# Patient Record
Sex: FEMALE | Race: WHITE | Employment: OTHER | URBAN - METROPOLITAN AREA
[De-identification: names, ages, dates, MRNs, and addresses within clinical notes are randomized per-mention and may not be internally consistent; named-entity substitution may affect disease eponyms.]

---

## 2019-07-30 ENCOUNTER — OFFICE VISIT (OUTPATIENT)
Dept: CARDIOLOGY CLINIC | Age: 59
End: 2019-07-30
Payer: MEDICARE

## 2019-07-30 VITALS
OXYGEN SATURATION: 94 % | HEIGHT: 62 IN | WEIGHT: 148 LBS | HEART RATE: 85 BPM | SYSTOLIC BLOOD PRESSURE: 145 MMHG | BODY MASS INDEX: 27.23 KG/M2 | DIASTOLIC BLOOD PRESSURE: 90 MMHG

## 2019-07-30 DIAGNOSIS — I25.10 CORONARY ARTERY DISEASE INVOLVING NATIVE HEART, ANGINA PRESENCE UNSPECIFIED, UNSPECIFIED VESSEL OR LESION TYPE: ICD-10-CM

## 2019-07-30 DIAGNOSIS — Z76.89 ENCOUNTER TO ESTABLISH CARE: Primary | ICD-10-CM

## 2019-07-30 DIAGNOSIS — E78.5 HYPERLIPIDEMIA, UNSPECIFIED HYPERLIPIDEMIA TYPE: ICD-10-CM

## 2019-07-30 DIAGNOSIS — G45.8 SUBCLAVIAN STEAL SYNDROME: ICD-10-CM

## 2019-07-30 DIAGNOSIS — I65.29 CAROTID ATHEROSCLEROSIS, UNSPECIFIED LATERALITY: ICD-10-CM

## 2019-07-30 PROCEDURE — G8419 CALC BMI OUT NRM PARAM NOF/U: HCPCS | Performed by: INTERNAL MEDICINE

## 2019-07-30 PROCEDURE — 1036F TOBACCO NON-USER: CPT | Performed by: INTERNAL MEDICINE

## 2019-07-30 PROCEDURE — 99204 OFFICE O/P NEW MOD 45 MIN: CPT | Performed by: INTERNAL MEDICINE

## 2019-07-30 PROCEDURE — 3017F COLORECTAL CA SCREEN DOC REV: CPT | Performed by: INTERNAL MEDICINE

## 2019-07-30 PROCEDURE — 93000 ELECTROCARDIOGRAM COMPLETE: CPT | Performed by: INTERNAL MEDICINE

## 2019-07-30 PROCEDURE — G8427 DOCREV CUR MEDS BY ELIG CLIN: HCPCS | Performed by: INTERNAL MEDICINE

## 2019-07-30 PROCEDURE — G8598 ASA/ANTIPLAT THER USED: HCPCS | Performed by: INTERNAL MEDICINE

## 2019-07-30 RX ORDER — GABAPENTIN 100 MG/1
100 CAPSULE ORAL 3 TIMES DAILY
COMMUNITY

## 2019-07-30 RX ORDER — DIAZEPAM 2 MG/1
2 TABLET ORAL EVERY 12 HOURS PRN
COMMUNITY

## 2019-07-30 SDOH — HEALTH STABILITY: MENTAL HEALTH: HOW OFTEN DO YOU HAVE A DRINK CONTAINING ALCOHOL?: NEVER

## 2019-07-30 NOTE — PROGRESS NOTES
completed at [address] in [facility] by Dr. Channing LOPEZ Plan  Clinically stable. No evidence of CHF. No angina. HTN controlled. Continue medical management of CAD with ASA and BB. Risk factor modification also discussed. Fasting lipid profile, CMP  before next visit. Carotid US to assess carotid disease s/p Carotid endarterectomy. Obtain records from [facility] in [facility]. Thank you very much for allowing me to participate in the care of your patient. Please do not hesitate to contact me if you have any questions. Sincerely,  Kimberlyn You MD    [address]    This note was scribed in the presence of Kimberlyn You MD by Vinicius Gonsalves RN.

## 2019-09-12 ENCOUNTER — TELEPHONE (OUTPATIENT)
Dept: CARDIOLOGY CLINIC | Age: 59
End: 2019-09-12

## 2019-10-09 ENCOUNTER — TELEPHONE (OUTPATIENT)
Dept: CARDIOLOGY CLINIC | Age: 59
End: 2019-10-09

## 2019-11-12 ENCOUNTER — OFFICE VISIT (OUTPATIENT)
Dept: CARDIOLOGY CLINIC | Age: 59
End: 2019-11-12
Payer: COMMERCIAL

## 2019-11-12 VITALS
SYSTOLIC BLOOD PRESSURE: 130 MMHG | WEIGHT: 145.8 LBS | DIASTOLIC BLOOD PRESSURE: 80 MMHG | OXYGEN SATURATION: 98 % | HEIGHT: 62 IN | HEART RATE: 78 BPM | BODY MASS INDEX: 26.83 KG/M2

## 2019-11-12 DIAGNOSIS — E78.00 PURE HYPERCHOLESTEROLEMIA: ICD-10-CM

## 2019-11-12 DIAGNOSIS — I25.119 CORONARY ARTERY DISEASE INVOLVING NATIVE CORONARY ARTERY OF NATIVE HEART WITH ANGINA PECTORIS (HCC): Primary | ICD-10-CM

## 2019-11-12 DIAGNOSIS — I73.9 CLAUDICATION (HCC): ICD-10-CM

## 2019-11-12 PROCEDURE — 93000 ELECTROCARDIOGRAM COMPLETE: CPT | Performed by: INTERNAL MEDICINE

## 2019-11-12 PROCEDURE — 99215 OFFICE O/P EST HI 40 MIN: CPT | Performed by: INTERNAL MEDICINE

## 2019-11-12 RX ORDER — ISOSORBIDE MONONITRATE 30 MG/1
30 TABLET, EXTENDED RELEASE ORAL DAILY
Qty: 90 TABLET | Refills: 3 | Status: SHIPPED | OUTPATIENT
Start: 2019-11-12 | End: 2020-02-04

## 2019-11-19 ENCOUNTER — OFFICE VISIT (OUTPATIENT)
Dept: CARDIOLOGY CLINIC | Age: 59
End: 2019-11-19
Payer: COMMERCIAL

## 2019-11-19 VITALS
SYSTOLIC BLOOD PRESSURE: 114 MMHG | DIASTOLIC BLOOD PRESSURE: 72 MMHG | HEIGHT: 62 IN | WEIGHT: 144 LBS | BODY MASS INDEX: 26.5 KG/M2 | HEART RATE: 84 BPM | OXYGEN SATURATION: 98 %

## 2019-11-19 DIAGNOSIS — I65.23 BILATERAL CAROTID ARTERY STENOSIS: ICD-10-CM

## 2019-11-19 DIAGNOSIS — G45.8 SUBCLAVIAN STEAL SYNDROME: Primary | ICD-10-CM

## 2019-11-19 DIAGNOSIS — E78.2 MIXED HYPERLIPIDEMIA: ICD-10-CM

## 2019-11-19 DIAGNOSIS — I25.10 CORONARY ARTERY DISEASE INVOLVING NATIVE CORONARY ARTERY OF NATIVE HEART WITHOUT ANGINA PECTORIS: ICD-10-CM

## 2019-11-19 DIAGNOSIS — I10 ESSENTIAL HYPERTENSION: ICD-10-CM

## 2019-11-19 DIAGNOSIS — I87.1: ICD-10-CM

## 2019-11-19 PROCEDURE — 99214 OFFICE O/P EST MOD 30 MIN: CPT | Performed by: INTERNAL MEDICINE

## 2019-12-04 ENCOUNTER — TELEPHONE (OUTPATIENT)
Dept: CARDIOLOGY CLINIC | Age: 59
End: 2019-12-04

## 2019-12-17 ENCOUNTER — TELEPHONE (OUTPATIENT)
Dept: CARDIOLOGY CLINIC | Age: 59
End: 2019-12-17

## 2019-12-17 NOTE — TELEPHONE ENCOUNTER
Dr. Hollins Leader please review and advise with any recommendations. Patient had CTA head and neck completed at [facility], scanned in for your review.

## 2020-01-09 NOTE — TELEPHONE ENCOUNTER
Attempted to call patient again today. Left message on voicemail asking patient to return call to our office regarding test results, OK per HIPAA.

## 2020-01-13 NOTE — TELEPHONE ENCOUNTER
Attempted to call Lachelle Deras and patients daughter Skylar. Left a message per HIP AA on their VM.

## 2020-01-20 NOTE — TELEPHONE ENCOUNTER
Called and left a message for [employer] (on HIPPA) and attempted to reach patient but was unable to leave a message.

## 2020-01-28 NOTE — PROGRESS NOTES
(REPATHA) 140 MG/ML SOSY Inject into the skin      aspirin 81 MG tablet Take 81 mg by mouth daily      gabapentin (NEURONTIN) 100 MG capsule Take 100 mg by mouth 3 times daily.  diazepam (VALIUM) 2 MG tablet Take 2 mg by mouth every 12 hours as needed for Anxiety.  vitamin D (CHOLECALCIFEROL) 1000 UNIT TABS tablet Take 1,000 Units by mouth daily      Cyanocobalamin (VITAMIN B 12 PO) Take by mouth      ticagrelor (BRILINTA) 90 MG TABS tablet Take 1 tablet by mouth 2 times daily 180 tablet 3    metoprolol tartrate (LOPRESSOR) 25 MG tablet Take two tablets by mouth in am and 1 tablet by mouth in pm 90 tablet 5     No current facility-administered medications for this visit. Review of Systems:  · Constitutional: No unanticipated weight loss. There's been no change in energy level, sleep pattern, or activity level. No fevers, chills. · Eyes: No visual changes or diplopia. No scleral icterus. · ENT: No Headaches, hearing loss or vertigo. No mouth sores or sore throat. · Cardiovascular: as reviewed in HPI  · Respiratory: No cough or wheezing, no sputum production. No hemoptysis. · Gastrointestinal: No abdominal pain, appetite loss, blood in stools. No change in bowel or bladder habits. · Genitourinary: No dysuria, trouble voiding, or hematuria. · Musculoskeletal:  No gait disturbance, no joint complaints. · Integumentary: No rash or pruritis. · Neurological: No headache, diplopia, change in muscle strength, numbness or tingling. · Psychiatric: No anxiety or depression. · Endocrine: No temperature intolerance. No excessive thirst, fluid intake, or urination. No tremor. · Hematologic/Lymphatic: No abnormal bruising or bleeding, blood clots or swollen lymph nodes. · Allergic/Immunologic: No nasal congestion or hives. Physical Exam:   Constitutional: The patient is oriented to person, place, and time. Appears well-developed and well-nourished. In no acute distress. Head: Xanthalamus. Normocephalic and atraumatic. Pupils equal and round. Neck: Neck supple. No JVP or carotid bruit appreciated. No mass and no thyromegaly present. No lymphadenopathy present. Cardiovascular: Normal rate. Normal heart sounds. Exam reveals no gallop and no friction rub. No murmur heard. Pulmonary/Chest: Effort normal and breath sounds normal. No respiratory distress. No wheezes, rhonchi or rales. Abdominal: Soft, non-tender. Bowel sounds are normal. Exhibits no organomegaly, mass or bruit. Extremities: No edema. No cyanosis or clubbing. Pulses are 2+ radial and carotid bilaterally. Neurological: No gross cranial nerve deficit. Coordination normal.   Skin: Skin is warm and dry. There is no rash or diaphoresis. Psychiatric: Patient has a normal mood and affect. Speech is normal and behavior is normal.      /82 (Site: Right Upper Arm, Position: Sitting, Cuff Size: Medium Adult)   Pulse 91   Ht 5' 2\" (1.575 m)   Wt 144 lb (65.3 kg)   SpO2 99%   BMI 26.34 kg/m²   Wt Readings from Last 3 Encounters:   02/04/20 144 lb (65.3 kg)   11/19/19 144 lb (65.3 kg)   11/12/19 145 lb 12.8 oz (66.1 kg)       Lab Review:   FLP:  No results found for: TRIG, HDL, LDLCALC, LDLDIRECT, LABVLDL  BUN/Creatinine:  No results found for: BUN, CREATININE    Assessment/Plan:     Coronary Artery Disease  -S/p stent 8/2017 to Circumflex/OM   - Echo 8/2017 LVEF normal, diastolic dysfx, LVH.   - EKG 11/12/2019 NSR, NS T wave changes. Hypertension   -Controlled. Hyperlipidemia   -Intolerant to oral statins (caused serve muscle aches)  -Taking Repatha (resumed 1 month ago)  -  12/2019. Carotid disease with subclavian steel  -S/p Carotid endarterectomy 2014  - 10/2019 73-88% LANDON, 76-13% LICA, L SC stenosis. Referred to Dr. Rama Seymour to discuss stenting. -CTA head and neck ordered 11/2019 by Dr. Rama Seymour showed 10-15% left carotid, 55-65% LANDON, 65-75% transient left vertebral artery.        Pseudo claudication Reports leg

## 2020-02-04 ENCOUNTER — OFFICE VISIT (OUTPATIENT)
Dept: CARDIOLOGY CLINIC | Age: 60
End: 2020-02-04
Payer: COMMERCIAL

## 2020-02-04 VITALS
OXYGEN SATURATION: 99 % | SYSTOLIC BLOOD PRESSURE: 124 MMHG | DIASTOLIC BLOOD PRESSURE: 82 MMHG | HEIGHT: 62 IN | HEART RATE: 91 BPM | WEIGHT: 144 LBS | BODY MASS INDEX: 26.5 KG/M2

## 2020-02-04 PROCEDURE — 99214 OFFICE O/P EST MOD 30 MIN: CPT | Performed by: INTERNAL MEDICINE

## 2020-02-04 RX ORDER — ASPIRIN 81 MG/1
81 TABLET ORAL DAILY
Qty: 90 TABLET | Refills: 1
Start: 2020-02-04

## 2020-02-04 NOTE — LETTER
[facility] - [facility] [address]  [address]  Phone: [phone]  Fax: [phone]    Noble Del Rosario MD        February 10, 2020     Greg Lopez  [address]    Patient: Ирина Matthews  MR Number: <[id number]>  YOB: 1960  Date of Visit: 2/4/2020    Dear Dr. Greg Lopez: Thank you for your referral. Progress note attached in visit summary. If you have questions, please do not hesitate to call me. I look forward to following Monserrat Govea along with you.     Sincerely,        Noble Del Rosario MD

## 2020-03-16 ENCOUNTER — TELEPHONE (OUTPATIENT)
Dept: CARDIOLOGY CLINIC | Age: 60
End: 2020-03-16

## 2020-03-16 NOTE — TELEPHONE ENCOUNTER
Patient called and stated she just got over the flu and is still coughing and she is on Brilinta. She says if she happens to get pneumonia can she be treated for it and be on Brilinta too? Shes worried by being on Brilinta she cannot have any procedures like a chest tube etc while on Brilinta. She does not have anything wrong with her at this,  time just worried it will turn into something.  Please advise at [phone]

## 2020-03-16 NOTE — TELEPHONE ENCOUNTER
Informed patient that she should not stop her Brilinta. If she needs treated for pneumonia she will still be able to receive treatment. She verbalized understanding.

## 2020-03-26 RX ORDER — EVOLOCUMAB 140 MG/ML
140 INJECTION, SOLUTION SUBCUTANEOUS
Qty: 6 SYRINGE | Refills: 3 | Status: SHIPPED | OUTPATIENT
Start: 2020-03-26

## 2020-03-30 ENCOUNTER — TELEPHONE (OUTPATIENT)
Dept: CARDIOLOGY CLINIC | Age: 60
End: 2020-03-30

## 2020-06-04 NOTE — PROGRESS NOTES
[facility]      Cardiology Consult    Latasha Lomas  1960    2020    Primary Physician: Cirilo Merlos  Reason for visit: CAD                          CC: \"     HPI:  The patient is 63 y.o. female with a past medical history significant for CAD, MI and HLD. She moved from [facility]. She had stent placed in 2017 and underwent a carotid endarterectomy in . She reports that she had a heart attack in 2017 she was taken to [facility] in [facility] and underwent cardiac cath and stent was placed to the Circumflex. ABD US was completed after a doctor heard a  bruit  US was negative for AAA. She reports that she also had LE doppler which showed no blockages. She also reports a history of subclavian steel syndrome and saw a doctor at [facility] and was told it was occluded and had collateral circulation.  Today,     Past Medical History:   Diagnosis Date    CAD (coronary artery disease)     Hx MI.     HLD (hyperlipidemia)     Hypertension     Subclavian artery stenosis (HCC)      Past Surgical History:   Procedure Laterality Date    CAROTID ENDARTERECTOMY      CORONARY ANGIOPLASTY WITH STENT PLACEMENT      DIAGNOSTIC CARDIAC CATH LAB PROCEDURE       Family History   Problem Relation Age of Onset    Heart Attack Mother      Social History     Tobacco Use    Smoking status: Former Smoker     Packs/day: 0.50     Types: Cigarettes     Last attempt to quit: 2014     Years since quittin.8    Smokeless tobacco: Never Used   Substance Use Topics    Alcohol use: Never     Frequency: Never    Drug use: Never       Allergies   Allergen Reactions    Sulfa Antibiotics      Current Outpatient Medications   Medication Sig Dispense Refill    Evolocumab (REPATHA) 140 MG/ML SOSY Inject 140 mg into the skin every 14 days 6 Syringe 3    aspirin EC 81 MG EC tablet Take 1 tablet by mouth daily 90 tablet 1    gabapentin (NEURONTIN) 100 MG capsule Take 100 mg by mouth 3 of CHF. HTN controlled. Continue medical management of CAD with Brilinlta (PAD/CAD), Repatha and BB. Advised to resume ASA 81mg daily. Consider switching Brilinta to plavix in future. Risk factor modification also discussed. Fasting lipid profile, CMP before next visit. F/u with Dr. Noah Vasquez regarding management of CAD. F/u in office in 6 months    This note was scribed in the presence of Dr Ramos La MD by Steve Paul RN.

## 2020-06-05 ENCOUNTER — OFFICE VISIT (OUTPATIENT)
Dept: CARDIOLOGY CLINIC | Age: 60
End: 2020-06-05
Payer: COMMERCIAL

## 2020-06-05 VITALS
BODY MASS INDEX: 24.77 KG/M2 | WEIGHT: 134.6 LBS | HEART RATE: 91 BPM | SYSTOLIC BLOOD PRESSURE: 134 MMHG | HEIGHT: 62 IN | TEMPERATURE: 97.7 F | DIASTOLIC BLOOD PRESSURE: 84 MMHG | OXYGEN SATURATION: 98 %

## 2020-06-05 PROCEDURE — 93000 ELECTROCARDIOGRAM COMPLETE: CPT | Performed by: INTERNAL MEDICINE

## 2020-06-05 PROCEDURE — 99215 OFFICE O/P EST HI 40 MIN: CPT | Performed by: INTERNAL MEDICINE

## 2020-06-05 RX ORDER — METOPROLOL SUCCINATE 25 MG/1
25 TABLET, EXTENDED RELEASE ORAL DAILY
Qty: 30 TABLET | Refills: 3 | Status: SHIPPED | OUTPATIENT
Start: 2020-06-05 | End: 2021-01-04

## 2020-06-05 NOTE — PROGRESS NOTES
[address]      Cardiology Consult    Ale Rangel  1960    June 5, 2020    Primary Physician: Luciana Castillo  Reason for visit: f/u CAD                          CC: \"my arm hurts. \"     HPI:  The patient is 63 y.o. female with a past medical history significant for CAD, MI and HLD. She moved from [facility]. She had stent placed in 8/2017 and underwent a carotid endarterectomy in 2014. She reports that she had a heart attack in 2017 she was taken to [facility] in [facility] and underwent cardiac cath and stent was placed to the Circumflex. ABD US was completed after a doctor heard a  bruit  US was negative for AAA. She reports that she also had LE doppler which showed no blockages. 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 Today, she reports worsening left arm pain for the past month that will wax and wane. She denies any chest pains or worsening shortness of breath. She denies any hand pain and states it is mostly in her bicep. She states she walked up the 4 flight of stairs up to the office today and denies any chest pains. Patient denies exertional chest pain/pressure, dyspnea at rest, ACLLE, PND, orthopnea, palpitations, lightheadedness, weight changes, changes in LE edema, and syncope. She reports compliance with her medications and tolerating.      Past Medical History:   Diagnosis Date    CAD (coronary artery disease)     Hx MI.     HLD (hyperlipidemia)     Hypertension     Subclavian artery stenosis (HCC)      Past Surgical History:   Procedure Laterality Date    CAROTID ENDARTERECTOMY      CORONARY ANGIOPLASTY WITH STENT PLACEMENT  2017    DIAGNOSTIC CARDIAC CATH LAB PROCEDURE       Family History   Problem Relation Age of Onset    Heart Attack Mother      Social History     Tobacco Use    Smoking status: Former Smoker     Packs/day: 0.50     Types: Cigarettes Last attempt to quit: 2014     Years since quittin.8    Smokeless tobacco: Never Used   Substance Use Topics    Alcohol use: Never     Frequency: Never    Drug use: Never       Allergies   Allergen Reactions    Sulfa Antibiotics      Current Outpatient Medications   Medication Sig Dispense Refill    metoprolol succinate (TOPROL XL) 25 MG extended release tablet Take 1 tablet by mouth daily 30 tablet 3    Evolocumab (REPATHA) 140 MG/ML SOSY Inject 140 mg into the skin every 14 days 6 Syringe 3    aspirin EC 81 MG EC tablet Take 1 tablet by mouth daily 90 tablet 1    gabapentin (NEURONTIN) 100 MG capsule Take 100 mg by mouth 3 times daily.  diazepam (VALIUM) 2 MG tablet Take 2 mg by mouth every 12 hours as needed for Anxiety.  vitamin D (CHOLECALCIFEROL) 1000 UNIT TABS tablet Take 1,000 Units by mouth daily      Cyanocobalamin (VITAMIN B 12 PO) Take by mouth       No current facility-administered medications for this visit. Review of Systems:  · Constitutional: No unanticipated weight loss. There's been no change in energy level, sleep pattern, or activity level. No fevers, chills. · Eyes: No visual changes or diplopia. No scleral icterus. · ENT: No Headaches, hearing loss or vertigo. No mouth sores or sore throat. · Cardiovascular: as reviewed in HPI  · Respiratory: No cough or wheezing, no sputum production. No hemoptysis. · Gastrointestinal: No abdominal pain, appetite loss, blood in stools. No change in bowel or bladder habits. · Genitourinary: No dysuria, trouble voiding, or hematuria. · Musculoskeletal:  No gait disturbance, no joint complaints. · Integumentary: No rash or pruritis. · Neurological: No headache, diplopia, change in muscle strength, numbness or tingling. · Psychiatric: No anxiety or depression. · Endocrine: No temperature intolerance. No excessive thirst, fluid intake, or urination. No tremor.   · Hematologic/Lymphatic: No abnormal bruising or bleeding, blood clots or swollen lymph nodes. · Allergic/Immunologic: No nasal congestion or hives. Physical Exam:   Constitutional: The patient is oriented to person, place, and time. Appears well-developed and well-nourished. In no acute distress. Head: Xanthalamus. Normocephalic and atraumatic. Pupils equal and round. Neck: Neck supple. No JVP or carotid bruit appreciated. No mass and no thyromegaly present. No lymphadenopathy present. Cardiovascular: Normal rate. Normal heart sounds. Exam reveals no gallop and no friction rub. No murmur heard. Pulmonary/Chest: Effort normal and breath sounds normal. No respiratory distress. No wheezes, rhonchi or rales. Abdominal: Soft, non-tender. Bowel sounds are normal. Exhibits no organomegaly, mass or bruit. Extremities: No edema. No cyanosis or clubbing. Pulses are 2+ radial and carotid bilaterally. Neurological: No gross cranial nerve deficit. Coordination normal.   Skin: Skin is warm and dry. There is no rash or diaphoresis. Psychiatric: Patient has a normal mood and affect. Speech is normal and behavior is normal.      /84 (Site: Left Upper Arm, Position: Sitting, Cuff Size: Medium Adult)   Pulse 91   Temp 97.7 °F (36.5 °C)   Ht 5' 2\" (1.575 m)   Wt 134 lb 9.6 oz (61.1 kg)   SpO2 98%   BMI 24.62 kg/m²   Wt Readings from Last 3 Encounters:   06/05/20 134 lb 9.6 oz (61.1 kg)   02/04/20 144 lb (65.3 kg)   11/19/19 144 lb (65.3 kg)       Lab Review:   FLP:  No results found for: TRIG, HDL, LDLCALC, LDLDIRECT, LABVLDL  BUN/Creatinine:  No results found for: BUN, CREATININE    Assessment/Plan:     Coronary Artery Disease  -S/p stent 8/2017 to Circumflex/OM   - Echo 8/2017 LVEF normal, diastolic dysfx, LVH.   - EKG 11/12/2019 NSR, NS T wave changes. Hypertension   -Controlled. Hyperlipidemia   -Intolerant to oral statins (caused serve muscle aches)  -Taking Repatha (resumed 1 month ago)  -  12/2019.      Carotid disease with subclavian

## 2020-06-05 NOTE — LETTER
[address] Cardiology - [address] [address] [address]  [address]  Phone: [phone]  Fax: [phone]    Mike Ramesh MD        June 22, 2020     Selvin Marinelli MD  [address] [facility]    Patient: Princess Andre  MR Number: <[id number]>  YOB: 1960  Date of Visit: 6/5/2020    Dear Dr. Selvin Marinelli: Thank you for your referral. Progress note attached in visit summary. If you have questions, please do not hesitate to call me. I look forward to following David Mustafa along with you.     Sincerely,        Mike Ramesh MD

## 2020-06-16 RX ORDER — CLOPIDOGREL BISULFATE 75 MG/1
75 TABLET ORAL DAILY
Qty: 90 TABLET | Refills: 3 | Status: SHIPPED | OUTPATIENT
Start: 2020-06-16 | End: 2020-08-11 | Stop reason: SINTOL

## 2020-06-16 RX ORDER — CLOPIDOGREL BISULFATE 75 MG/1
75 TABLET ORAL DAILY
COMMUNITY
End: 2020-06-16 | Stop reason: SDUPTHER

## 2020-08-11 NOTE — TELEPHONE ENCOUNTER
Patient notified. [facility] updated and called to Julieta Estevez in [place]. She will get stress test rescheduled  and call sol with date if it needs precerted.

## 2020-08-11 NOTE — TELEPHONE ENCOUNTER
Pt calling stating that she was on brillinta 90mg, that was to strong for her, we changed her to plavix and she says that is making her nauseous and sick to her stomach. She would like to see of she can go back on brillinta 60mg and see if that would work for her at a lower dose.

## 2020-12-02 PROBLEM — I10 ESSENTIAL HYPERTENSION: Status: ACTIVE | Noted: 2020-12-02

## 2020-12-02 PROBLEM — I87.1: Status: ACTIVE | Noted: 2020-12-02

## 2020-12-29 PROBLEM — I73.9 PAD (PERIPHERAL ARTERY DISEASE) (HCC): Status: ACTIVE | Noted: 2020-12-29

## 2020-12-29 NOTE — PROGRESS NOTES
[facility]      Cardiology Consult    Areli Martin  1960    January 4, 2021    Referring Physician: Aubrey Malhotra NP  Reason for Visit:Management of CAD             CC: \"palpitations when I tried to wean the toprol. \"    HPI:  The patient is 63 y.o. female previously followed by Dr. Trey Irwin with a past medical history significant for CAD, MI and HLD. She moved from [facility]. She had stent placed in 8/2017 and underwent a carotid endarterectomy in 2014. She reports that she had a heart attack in 2017 she was taken to [facility] in [facility] and underwent cardiac cath and stent was placed to the Circumflex. ABD US was completed after a doctor heard a  bruit  US was negative for AAA. She reports that she also had LE doppler which showed no blockages. She also reports a history of subclavian steel syndrome and saw a doctor at [facility] clinic and was told it was occluded and had collateral circulation. Today, the patient denies exertional chest pain, dizziness, syncope, worsening leg swelling and worsening dyspnea. She states that she has been feeling well. She tried to [employer] off\" her metoprolol and noted palpitations. The Toprol caused fatigue and hair loss. Last week, she noted a \"buzzing sound, felt hot and sweaty\" and thought that she may pass out. She lied down and her symptoms resolved. She states that she is under a lot of emotional stress and anxiety related to her husbands death. She thinks that she had COVID in March 2020. She is taking Brilinta only once daily.        Past Medical History:   Diagnosis Date    CAD (coronary artery disease)     Hx MI.     HLD (hyperlipidemia)     Hypertension     Subclavian artery stenosis (HCC)      Past Surgical History:   Procedure Laterality Date    CAROTID ENDARTERECTOMY      CORONARY ANGIOPLASTY WITH STENT PLACEMENT  2017    DIAGNOSTIC CARDIAC CATH LAB PROCEDURE       Family History   Problem Relation Age of Onset    Heart Attack Mother Social History     Tobacco Use    Smoking status: Former Smoker     Packs/day: 0.50     Types: Cigarettes     Quit date: 2014     Years since quittin.4    Smokeless tobacco: Never Used   Substance Use Topics    Alcohol use: Never     Frequency: Never    Drug use: Never       Allergies   Allergen Reactions    Sulfa Antibiotics      Current Outpatient Medications   Medication Sig Dispense Refill    ticagrelor (BRILINTA) 60 MG TABS tablet Take 1 tablet by mouth 2 times daily 60 tablet 5    Evolocumab (REPATHA) 140 MG/ML SOSY Inject 140 mg into the skin every 14 days 6 Syringe 3    aspirin EC 81 MG EC tablet Take 1 tablet by mouth daily 90 tablet 1    gabapentin (NEURONTIN) 100 MG capsule Take 100 mg by mouth 3 times daily.  diazepam (VALIUM) 2 MG tablet Take 2 mg by mouth every 12 hours as needed for Anxiety.  vitamin D (CHOLECALCIFEROL) 1000 UNIT TABS tablet Take 1,000 Units by mouth daily      Cyanocobalamin (VITAMIN B 12 PO) Take by mouth       No current facility-administered medications for this visit. Review of Systems:  · Constitutional: no unanticipated weight loss. There's been no change in energy level, sleep pattern, or activity level. No fevers, chills. · Eyes: No visual changes or diplopia. No scleral icterus. · ENT: No Headaches, hearing loss or vertigo. No mouth sores or sore throat. · Cardiovascular: as reviewed in HPI  · Respiratory: No cough or wheezing, no sputum production. No hematemesis. · Gastrointestinal: No abdominal pain, appetite loss, blood in stools. No change in bowel or bladder habits. · Genitourinary: No dysuria, trouble voiding, or hematuria. · Musculoskeletal:  No gait disturbance, no joint complaints. · Integumentary: No rash or pruritis. · Neurological: No headache, diplopia, change in muscle strength, numbness or tingling. · Psychiatric: No anxiety or depression. · Endocrine: No temperature intolerance.  No excessive thirst, fluid intake, or urination. No tremor. · Hematologic/Lymphatic: No abnormal bruising or bleeding, blood clots or swollen lymph nodes. · Allergic/Immunologic: No nasal congestion or hives. Physical Exam:   /80 (Site: Right Upper Arm, Position: Sitting, Cuff Size: Medium Adult)   Pulse 90   Temp 96.8 °F (36 °C)   Ht 5' 2\" (1.575 m)   Wt 128 lb 6.4 oz (58.2 kg)   SpO2 98%   BMI 23.48 kg/m²   Wt Readings from Last 3 Encounters:   01/04/21 128 lb 6.4 oz (58.2 kg)   06/05/20 134 lb 9.6 oz (61.1 kg)   02/04/20 144 lb (65.3 kg)     Constitutional: She is oriented to person, place, and time. She appears well-developed and well-nourished. In no acute distress. Head: Normocephalic and atraumatic. Pupils equal and round. Neck: Neck supple. No JVP or carotid bruit appreciated. No mass and no thyromegaly present. No lymphadenopathy present. Cardiovascular: Normal rate. Normal heart sounds. Exam reveals no gallop and no friction rub. No murmur heard. Pulmonary/Chest: Effort normal and breath sounds normal. No respiratory distress. She has no wheezes, rhonchi or rales. Abdominal: Soft, non-tender. Bowel sounds are normal. She exhibits no organomegaly, mass or bruit. Extremities: No edema, cyanosis, or clubbing. Pulses are 2+ radial/dorsalis pedis/posterior tibial/carotid bilaterally. Neurological: No gross cranial nerve deficit. Coordination normal.   Skin: Skin is warm and dry. There is no rash or diaphoresis. Psychiatric: She has a normal mood and affect. Her speech is normal and behavior is normal.     Lab Review:   FLP:  No results found for: TRIG, HDL, LDLCALC, LDLDIRECT, LABVLDL  BUN/Creatinine:  No results found for: BUN, CREATININE    Imaging reviewed    Assessment/Plan:   Coronary Artery Disease  - Currently denies any symptoms of angina  -S/p stent 8/2017 to Circumflex/OM   - Echo 8/2017 LVEF normal, diastolic dysfx, LVH.   - EKG 11/12/2019 NSR, NS T wave changes.    - Taking Brilinta once daily  - Encouraged to take Brilinta BID.    -Will switch Toprol XL to bystolic 5mg daily in view of hair loss and fatigue.   -Encouraged to get flu vaccination. Hypertension   -Controlled. Cr normal 7/2020.     Hyperlipidemia   -Intolerant to oral statins (caused serve muscle aches)  -Taking Repatha   -  12/2019.   -Fasting lipid profile, CMP soon. -LDL goal ,70     Carotid disease with subclavian steel  -S/p Carotid endarterectomy 2014  -US 10/2019 86-29% LANDON, 45-13% LICA, L SC stenosis. Referred to Dr. Hernandez Seen to discuss stenting. -CTA head and neck ordered 11/2019 by Dr. Hernandez Seen showed 10-15% left carotid, 55-65% LANDON, 65-75% transient left vertebral artery.      Pseudo claudication Reports leg pain with walking.  -Report recent testing was completed at [address] in [facility] by Dr. Wesley SANZ.   - Arterial doppler studies ordered 11/2019 but not done.     Chest pain. R/o ischemia. - Myoview GXT recommended 11/2019 but not done  -EKG 6/5/20 NSR     F/u in 6 months. Thank you very much for allowing me to participate in the care of your patient. Please do not hesitate to contact me if you have any questions. Sincerely,  Gabby Villatoro MD      [address] [place] [address]  Ph: [phone]  Fax: [phone]    This note was scribed in the presence of the physician by Rosalio Snyder RN.

## 2021-01-04 ENCOUNTER — OFFICE VISIT (OUTPATIENT)
Dept: CARDIOLOGY CLINIC | Age: 61
End: 2021-01-04
Payer: COMMERCIAL

## 2021-01-04 VITALS
DIASTOLIC BLOOD PRESSURE: 80 MMHG | OXYGEN SATURATION: 98 % | WEIGHT: 128.4 LBS | SYSTOLIC BLOOD PRESSURE: 130 MMHG | BODY MASS INDEX: 23.63 KG/M2 | HEART RATE: 90 BPM | HEIGHT: 62 IN | TEMPERATURE: 96.8 F

## 2021-01-04 DIAGNOSIS — I10 ESSENTIAL HYPERTENSION: ICD-10-CM

## 2021-01-04 DIAGNOSIS — I25.10 CORONARY ARTERY DISEASE INVOLVING NATIVE CORONARY ARTERY OF NATIVE HEART WITHOUT ANGINA PECTORIS: Primary | ICD-10-CM

## 2021-01-04 DIAGNOSIS — E78.2 MIXED HYPERLIPIDEMIA: ICD-10-CM

## 2021-01-04 DIAGNOSIS — I73.9 PAD (PERIPHERAL ARTERY DISEASE) (HCC): ICD-10-CM

## 2021-01-04 PROCEDURE — 99213 OFFICE O/P EST LOW 20 MIN: CPT | Performed by: INTERNAL MEDICINE

## 2021-01-04 RX ORDER — NEBIVOLOL 5 MG/1
5 TABLET ORAL DAILY
Qty: 90 TABLET | Refills: 3 | Status: SHIPPED | OUTPATIENT
Start: 2021-01-04

## 2021-01-04 NOTE — PATIENT INSTRUCTIONS
Patient Education   stop toprol and take bystolic 5mg in place of toprol     Learning About Coronary Artery Disease (CAD)  What is coronary artery disease? Coronary artery disease (CAD) occurs when plaque builds up in the arteries that bring oxygen-rich blood to your heart. Plaque is a fatty substance made of cholesterol, calcium, and other substances in the blood. This process is called hardening of the arteries, or atherosclerosis. What happens when you have coronary artery disease? · Plaque may narrow the coronary arteries. Narrowed arteries cause poor blood flow. This can lead to angina symptoms such as chest pain or discomfort. If blood flow is completely blocked, you could have a heart attack. · You can slow CAD and reduce the risk of future problems by making changes in your lifestyle. These include quitting smoking and eating heart-healthy foods. · Treatments for CAD, along with changes in your lifestyle, can help you live a longer and healthier life. How can you prevent coronary artery disease? · Do not smoke. It may be the best thing you can do to prevent heart disease. If you need help quitting, talk to your doctor about stop-smoking programs and medicines. These can increase your chances of quitting for good. · Be active. Get at least 30 minutes of exercise on most days of the week. Walking is a good choice. You also may want to do other activities, such as running, swimming, cycling, or playing tennis or team sports. · Eat heart-healthy foods. Eat more fruits and vegetables and less foods that contain saturated and trans fats. Limit alcohol, sodium, and sweets. · Stay at a healthy weight. Lose weight if you need to. · Manage other health problems such as diabetes, high blood pressure, and high cholesterol. How is coronary artery disease treated? · Your doctor will suggest that you make lifestyle changes.  For example, your doctor may ask you to eat healthy foods, quit smoking, lose extra weight, and be more active. · You will have to take medicines. · Your doctor may suggest a procedure to open narrowed or blocked arteries. This is called angioplasty. Or your doctor may suggest using healthy blood vessels to create detours around narrowed or blocked arteries. This is called bypass surgery. Follow-up care is a key part of your treatment and safety. Be sure to make and go to all appointments, and call your doctor if you are having problems. It's also a good idea to know your test results and keep a list of the medicines you take. Where can you learn more? Go to https://[employer] org and sign in to your [employer] account. Enter [phone] in the [employer] box to learn more about \"Learning About Coronary Artery Disease (CAD). \"     If you do not have an account, please click on the \"Sign Up Now\" link. Current as of: December 16, 2019               Content Version: 12.6  © 5091-1083 [employer]. Care instructions adapted under license by [facility] ([facility]). If you have questions about a medical condition or this instruction, always ask your healthcare professional. [place] [address] any warranty or liability for your use of this information.